# Patient Record
Sex: FEMALE | Race: WHITE
[De-identification: names, ages, dates, MRNs, and addresses within clinical notes are randomized per-mention and may not be internally consistent; named-entity substitution may affect disease eponyms.]

---

## 2018-06-09 ENCOUNTER — HOSPITAL ENCOUNTER (EMERGENCY)
Dept: HOSPITAL 17 - PHED | Age: 73
Discharge: HOME | End: 2018-06-09
Payer: MEDICARE

## 2018-06-09 VITALS
DIASTOLIC BLOOD PRESSURE: 94 MMHG | OXYGEN SATURATION: 95 % | SYSTOLIC BLOOD PRESSURE: 189 MMHG | RESPIRATION RATE: 16 BRPM | HEART RATE: 74 BPM

## 2018-06-09 VITALS
DIASTOLIC BLOOD PRESSURE: 95 MMHG | OXYGEN SATURATION: 96 % | HEART RATE: 75 BPM | TEMPERATURE: 98.4 F | RESPIRATION RATE: 18 BRPM | SYSTOLIC BLOOD PRESSURE: 205 MMHG

## 2018-06-09 VITALS — HEIGHT: 63 IN | WEIGHT: 134.48 LBS | BODY MASS INDEX: 23.83 KG/M2

## 2018-06-09 VITALS — SYSTOLIC BLOOD PRESSURE: 167 MMHG | DIASTOLIC BLOOD PRESSURE: 80 MMHG

## 2018-06-09 DIAGNOSIS — I10: ICD-10-CM

## 2018-06-09 DIAGNOSIS — E78.00: ICD-10-CM

## 2018-06-09 DIAGNOSIS — F41.9: ICD-10-CM

## 2018-06-09 DIAGNOSIS — G44.209: Primary | ICD-10-CM

## 2018-06-09 DIAGNOSIS — F32.9: ICD-10-CM

## 2018-06-09 DIAGNOSIS — R42: ICD-10-CM

## 2018-06-09 DIAGNOSIS — F02.80: ICD-10-CM

## 2018-06-09 DIAGNOSIS — G30.9: ICD-10-CM

## 2018-06-09 PROCEDURE — 96375 TX/PRO/DX INJ NEW DRUG ADDON: CPT

## 2018-06-09 PROCEDURE — 96374 THER/PROPH/DIAG INJ IV PUSH: CPT

## 2018-06-09 PROCEDURE — 99284 EMERGENCY DEPT VISIT MOD MDM: CPT

## 2018-06-09 NOTE — PD
HPI


.


Headache


Chief Complaint:  Hypertension


Time Seen by Provider:  18:07


Travel History


International Travel<30 days:  No


Contact w/Intl Traveler<30days:  No


Traveled to known affect area:  No





History of Present Illness


HPI


This is a patient with dementia who is also hard of hearing who presents with 

her daughter with chief complaint of a frontal headache.  Onset was a couple of 

days ago.  The pain is described as a pounding sensation which is currently 

rated 10/10 and which has been partially relieved by Aleve.  Associated 

symptoms include dizziness.  The patient's daughter is also concerned about the 

patient's blood pressure.  She has a history of hypertension.





PFSH


Past Medical History


Alzheimer's Disease:  Yes


Anxiety:  Yes


Depression:  Yes


Cardiovascular Problems:  Yes


High Cholesterol:  Yes


Diminished Hearing:  Yes (WEARS HEARING AIDS)


Hypertension:  Yes


Immunizations Current:  Yes


Pregnant?:  Not Pregnant





Past Surgical History


Surgical History:  No Previous Surgery





Social History


Alcohol Use:  Yes (WINE IN EVENINGS)


Tobacco Use:  No (NEVER)


Substance Use:  No





Allergies-Medications


(Allergen,Severity, Reaction):  


Coded Allergies:  


     No Known Allergies (Verified  Allergy, Unknown, 6/9/18)


Reported Meds & Prescriptions





Reported Meds & Active Scripts


Active


Reported


Amlodipine (Amlodipine Besylate) 2.5 Mg Tab 2.5 Mg PO DAILY


Trazodone (Trazodone HCl) 100 Mg Tablet 100 Mg PO HS


Vytorin (Ezetimibe-Simvastatin) 10-20 Mg Tab 1 Tab PO HS


Donepezil 10 Mg Tab 10 Mg PO HS


Valsartan 320 Mg Tab 320 Mg PO DAILY


Levothyroxine (Levothyroxine Sodium) 50 Mcg Tab 50 Mcg PO DAILY


Citalopram (Citalopram Hydrobromide) 20 Mg Tab 20 Mg PO DAILY








Review of Systems


Except as stated in HPI:  all other systems reviewed are Neg


General / Constitutional:  No: Fever, Chills


Eyes:  No: Blurred Vision


HENT:  Positive: Headaches, Lightheadedness





Physical Exam


Narrative


GENERAL: Awake and alert.  She has a little bit of difficulty following 

instructions.


SKIN:  warm/dry.


HEAD: Normocephalic.  Positive scalp tenderness.


EYES: Pupils equal and round.  Extraocular movements are intact.


ENT:  Mucous membranes pink and moist.


NECK: Supple.  Full range of motion without pain.. 


CARDIOVASCULAR: Regular rate and rhythm.  


RESPIRATORY: No accessory muscle use. Clear to auscultation. Breath sounds 

equal bilaterally.


MUSCULOSKELETAL: No obvious deformities.  Normal muscle tone.


NEUROLOGICAL: Awake and alert. No obvious cranial nerve deficits.  Motor 

grossly within normal limits. Normal speech.  She is unable to do finger-nose-

finger exam.  She is unable to understand and follow the instructions.


PSYCHIATRIC: Appropriate mood and affect; insight and judgment normal.





Data


Data


Last Documented VS





Vital Signs








  Date Time  Temp Pulse Resp B/P (MAP) Pulse Ox O2 Delivery O2 Flow Rate FiO2


 


6/9/18 18:14  74 16 189/94 (125) 95 Room Air  


 


6/9/18 17:54 98.4       








Orders





 Orders


^ Saline Lock (6/9/18 18:08)


Diphenhydramine Inj (Benadryl Inj) (6/9/18 18:15)


Prochlorperazine Inj (Compazine Inj) (6/9/18 18:15)








MDM


Medical Decision Making


Medical Screen Exam Complete:  Yes


Emergency Medical Condition:  Yes


Differential Diagnosis


Differential diagnosis of headache includes but is not limited to migraine, 

muscle contraction headache, brain tumor, brain bleed


Narrative Course


This patient presents with a frontal headache.  She has a muscle contraction 

type headache based upon her physical exam.  She is being treated with IV 

Compazine and Benadryl.  I suspect that her blood pressure will improve as her 

pain improves.





Her headache is better and her blood pressure is better.





Diagnosis





 Primary Impression:  


 Headache


 Qualified Codes:  G44.209 - Tension-type headache, unspecified, not intractable


Patient Instructions:  Acute Headache (DC), General Instructions


Disposition:  01 DISCHARGE HOME


Condition:  Stable











Farrah Osborne MD Jun 9, 2018 18:41